# Patient Record
Sex: FEMALE | Race: WHITE | NOT HISPANIC OR LATINO | Employment: OTHER | URBAN - METROPOLITAN AREA
[De-identification: names, ages, dates, MRNs, and addresses within clinical notes are randomized per-mention and may not be internally consistent; named-entity substitution may affect disease eponyms.]

---

## 2018-04-24 ENCOUNTER — HOSPITAL ENCOUNTER (EMERGENCY)
Facility: CLINIC | Age: 43
Discharge: HOME | End: 2018-04-25
Attending: EMERGENCY MEDICINE | Admitting: EMERGENCY MEDICINE

## 2018-04-24 DIAGNOSIS — R45.1 AGITATION: ICD-10-CM

## 2018-04-24 DIAGNOSIS — F15.10 METHAMPHETAMINE ABUSE (H): ICD-10-CM

## 2018-04-24 PROCEDURE — 99283 EMERGENCY DEPT VISIT LOW MDM: CPT

## 2018-04-24 PROCEDURE — 25000132 ZZH RX MED GY IP 250 OP 250 PS 637: Performed by: EMERGENCY MEDICINE

## 2018-04-24 RX ORDER — DIAZEPAM 5 MG
5 TABLET ORAL ONCE
Status: COMPLETED | OUTPATIENT
Start: 2018-04-24 | End: 2018-04-24

## 2018-04-24 RX ORDER — LIDOCAINE 40 MG/G
CREAM TOPICAL
Status: DISCONTINUED | OUTPATIENT
Start: 2018-04-24 | End: 2018-04-25

## 2018-04-24 RX ORDER — SODIUM CHLORIDE 9 MG/ML
1000 INJECTION, SOLUTION INTRAVENOUS CONTINUOUS
Status: DISCONTINUED | OUTPATIENT
Start: 2018-04-24 | End: 2018-04-25

## 2018-04-24 RX ADMIN — DIAZEPAM 5 MG: 5 TABLET ORAL at 23:59

## 2018-04-24 ASSESSMENT — ENCOUNTER SYMPTOMS: ABDOMINAL PAIN: 0

## 2018-04-24 NOTE — ED AVS SNAPSHOT
[facility] Emergency Department    [address]    [facility] [address]    Phone:  [phone]    Fax:  [phone]                                       Maricarmen Tovar   MRN: [id number]    Department:  [facility] Emergency Department   Date of Visit:  4/24/2018           After Visit Summary Signature Page     I have received my discharge instructions, and my questions have been answered. I have discussed any challenges I see with this plan with the nurse or doctor.    ..........................................................................................................................................  Patient/Patient Representative Signature      ..........................................................................................................................................  Patient Representative Print Name and Relationship to Patient    ..................................................               ................................................  Date                                            Time    ..........................................................................................................................................  Reviewed by Signature/Title    ...................................................              ..............................................  Date                                                            Time

## 2018-04-24 NOTE — ED AVS SNAPSHOT
[facility] Emergency Department    [address]    [facility] [address]    Phone:  [phone]    Fax:  [phone]                                       Maricarmen Tovar   MRN: [id number]    Department:  [facility] Emergency Department   Date of Visit:  4/24/2018           Patient Information     Date Of Birth          1975        Your diagnoses for this visit were:     Methamphetamine abuse     Agitation        You were seen by Mara Lopez MD and Edwin Lancaster MD.      Follow-up Information     Follow up with Primary clinic, chemical dependency clinic.    Why:  when you return home        Follow up with [facility] Emergency Department.    Specialty:  EMERGENCY MEDICINE    Why:  As needed, If symptoms worsen    Contact information:    [address]  [facility] [address] [phone]      Discharge References/Attachments     METHAMPHETAMINE ABUSE AND ADDICTION, UNDERSTANDING (ENGLISH)      24 Hour Appointment Hotline       To make an appointment at any [facility], call [phone] ([phone]). If you don't have a family doctor or clinic, we will help you find one. [facility] clinics are conveniently located to serve the needs of you and your family.             Review of your medicines      Notice     You have not been prescribed any medications.            Procedures and tests performed during your visit     Social Work IP Consult      Orders Needing Specimen Collection     None      Pending Results     No orders found for last 3 day(s).            Pending Culture Results     No orders found for last 3 day(s).            Pending Results Instructions     If you had any lab results that were not finalized at the time of your Discharge, you can call the ED Lab Result RN at [phone]. You will be contacted by this team for any positive Lab results or changes in treatment. The nurses are available 7 days a  week from 10A to 6:30P.  You can leave a message 24 hours per day and they will return your call.        Test Results From Your Hospital Stay               Clinical Quality Measure: Blood Pressure Screening     Your blood pressure was checked while you were in the emergency department today. The last reading we obtained was  BP: (!) 155/100 . Please read the guidelines below about what these numbers mean and what you should do about them.  If your systolic blood pressure (the top number) is less than 120 and your diastolic blood pressure (the bottom number) is less than 80, then your blood pressure is normal. There is nothing more that you need to do about it.  If your systolic blood pressure (the top number) is 120-139 or your diastolic blood pressure (the bottom number) is 80-89, your blood pressure may be higher than it should be. You should have your blood pressure rechecked within a year by a primary care provider.  If your systolic blood pressure (the top number) is 140 or greater or your diastolic blood pressure (the bottom number) is 90 or greater, you may have high blood pressure. High blood pressure is treatable, but if left untreated over time it can put you at risk for heart attack, stroke, or kidney failure. You should have your blood pressure rechecked by a primary care provider within the next 4 weeks.  If your provider in the emergency department today gave you specific instructions to follow-up with your doctor or provider even sooner than that, you should follow that instruction and not wait for up to 4 weeks for your follow-up visit.        Thank you for choosing [facility]       Thank you for choosing [facility] for your care. Our goal is always to provide you with excellent care. Hearing back from our patients is one way we can continue to improve our services. Please take a few minutes to complete the written survey that you may receive in the mail after you visit with us. Thank you!        Karla  "Information     [employer] lets you send messages to your doctor, view your test results, renew your prescriptions, schedule appointments and more. To sign up, go to [email or web address] . Click on \"Log in\" on the left side of the screen, which will take you to the Welcome page. Then click on \"Sign up Now\" on the right side of the page.     You will be asked to enter the access code listed below, as well as some personal information. Please follow the directions to create your username and password.     Your access code is: [email or web address]  Expires: 2018 11:56 AM     Your access code will  in 90 days. If you need help or a new code, please call your [facility] clinic or [phone].        Care EveryWhere ID     This is your Care EveryWhere ID. This could be used by other organizations to access your [facility] medical records  [id number]        Equal Access to Services     ASMITA DERAS AH: Hadii carmen joneso Sonarinder, waaxda lugiorgio, qaybta kaalmada aderuslan, phyllis cummings . So [facility] [phone].    ATENCIÓN: Si habla español, tiene a kelly disposición servicios gratuitos de asistencia lingüística. Llame al [phone].    We comply with applicable federal civil rights laws and [place] laws. We do not discriminate on the basis of race, color, national origin, age, disability, sex, sexual orientation, or gender identity.            After Visit Summary       This is your record. Keep this with you and show to your community pharmacist(s) and doctor(s) at your next visit.                  "

## 2018-04-25 VITALS
OXYGEN SATURATION: 98 % | TEMPERATURE: 98.8 F | RESPIRATION RATE: 18 BRPM | SYSTOLIC BLOOD PRESSURE: 144 MMHG | DIASTOLIC BLOOD PRESSURE: 90 MMHG | WEIGHT: 110 LBS | HEART RATE: 86 BPM

## 2018-04-25 ASSESSMENT — ENCOUNTER SYMPTOMS: AGITATION: 1

## 2018-04-25 NOTE — ED NOTES
"Pt tolerating regular meal tray. Up at bedside Gait steady. Pt calling mother for possible ride to her home. Has declined homeless shelter. \" I am going to hitch a ride while its nice outside.   "
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
## 2018-04-25 NOTE — ED NOTES
Patient refusing IV and EKG currently, also refusing blood draw. Patient requesting for food. Tatamy and water was provided to patient. Will let patient eat and recheck later for blood draw. Still appearing manic and very restless. Denies any chest pain, sob, and physical complaints. MD made aware. Will continue to monitor.

## 2018-04-25 NOTE — ED PROVIDER NOTES
[facility] ED Behavioral Health Handoff Note:       Brief HPI:  This is a 43 year old female signed out to me by Dr. Lopez .  See initial ED Provider note for details of the presentation.     The patient is on a hold.  The type of hold is ALETA.        The patient has required medication for agitation. Patient got one dose of PO valium with good effect.    Exam:   Temp:  [98.8  F (37.1  C)] 98.8  F (37.1  C)  Pulse:  [100] 100  Heart Rate:  [100-106] 106  Resp:  [18-22] 18  BP: (138-149)/(112-127) 138/112  SpO2:  [98 %-100 %] 98 %  Patient calm, arranging her belongings on the ED stretcher and floor in preparation for discharge. She is awake alert and appropriate. Calm and cooperative. She has normal speech and no evidence of psychomotor agitation or slowing. Exhibits goal directed thinking. Gait normal and steady.     ED Course:    There were no significant events while under my care.    Social work spoke with patient to offer resources in the area. The patient declined local resources. Bus tokens provided to help her with transportation to get home. Patient was sober and stable for discharge. She denied any acute medical concerns prior to discharge.         Impression:    ICD-10-CM    1. Methamphetamine abuse F15.10    2. Agitation R45.1        Disposition:  Discharged in improved condition        Edwin Booth MD  04/25/18 2054
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
## 2018-04-25 NOTE — ED NOTES
Patient is resting in bed and is again refusing EKG, IV fluids, and blood draw. Wants to be left alone, otherwise has no other complaints.

## 2018-04-25 NOTE — ED NOTES
"Pt up to the BR. Gait steady. Pt states \" I feel better just a little tired.\" Tolerating oral fluids without nausea.   "

## 2018-08-03 ENCOUNTER — HOSPITAL ENCOUNTER (EMERGENCY)
Facility: CLINIC | Age: 43
Discharge: HOME | End: 2018-08-03
Attending: EMERGENCY MEDICINE | Admitting: EMERGENCY MEDICINE

## 2018-08-03 ENCOUNTER — APPOINTMENT (OUTPATIENT)
Dept: CT IMAGING | Facility: CLINIC | Age: 43
End: 2018-08-03
Attending: EMERGENCY MEDICINE

## 2018-08-03 VITALS
HEART RATE: 87 BPM | DIASTOLIC BLOOD PRESSURE: 98 MMHG | OXYGEN SATURATION: 99 % | SYSTOLIC BLOOD PRESSURE: 151 MMHG | RESPIRATION RATE: 16 BRPM | TEMPERATURE: 98.6 F

## 2018-08-03 DIAGNOSIS — L03.213 PERIORBITAL CELLULITIS OF LEFT EYE: ICD-10-CM

## 2018-08-03 LAB
ANION GAP SERPL CALCULATED.3IONS-SCNC: 6 MMOL/L (ref 3–14)
BASOPHILS # BLD AUTO: 0.1 10E9/L (ref 0–0.2)
BASOPHILS NFR BLD AUTO: 1 %
BUN SERPL-MCNC: 14 MG/DL (ref 7–30)
CALCIUM SERPL-MCNC: 8.4 MG/DL (ref 8.5–10.1)
CHLORIDE SERPL-SCNC: 106 MMOL/L (ref 94–109)
CO2 SERPL-SCNC: 28 MMOL/L (ref 20–32)
CREAT SERPL-MCNC: 0.87 MG/DL (ref 0.52–1.04)
DIFFERENTIAL METHOD BLD: NORMAL
EOSINOPHIL # BLD AUTO: 0.2 10E9/L (ref 0–0.7)
EOSINOPHIL NFR BLD AUTO: 2.5 %
ERYTHROCYTE [DISTWIDTH] IN BLOOD BY AUTOMATED COUNT: 12.5 % (ref 10–15)
GFR SERPL CREATININE-BSD FRML MDRD: 71 ML/MIN/1.7M2
GLUCOSE SERPL-MCNC: 94 MG/DL (ref 70–99)
HCT VFR BLD AUTO: 44.7 % (ref 35–47)
HGB BLD-MCNC: 14.6 G/DL (ref 11.7–15.7)
IMM GRANULOCYTES # BLD: 0 10E9/L (ref 0–0.4)
IMM GRANULOCYTES NFR BLD: 0.2 %
LYMPHOCYTES # BLD AUTO: 2.4 10E9/L (ref 0.8–5.3)
LYMPHOCYTES NFR BLD AUTO: 30 %
MCH RBC QN AUTO: 30.2 PG (ref 26.5–33)
MCHC RBC AUTO-ENTMCNC: 32.7 G/DL (ref 31.5–36.5)
MCV RBC AUTO: 93 FL (ref 78–100)
MONOCYTES # BLD AUTO: 0.5 10E9/L (ref 0–1.3)
MONOCYTES NFR BLD AUTO: 6 %
NEUTROPHILS # BLD AUTO: 4.8 10E9/L (ref 1.6–8.3)
NEUTROPHILS NFR BLD AUTO: 60.3 %
NRBC # BLD AUTO: 0 10*3/UL
NRBC BLD AUTO-RTO: 0 /100
PLATELET # BLD AUTO: 280 10E9/L (ref 150–450)
POTASSIUM SERPL-SCNC: 3.7 MMOL/L (ref 3.4–5.3)
RBC # BLD AUTO: 4.83 10E12/L (ref 3.8–5.2)
SODIUM SERPL-SCNC: 140 MMOL/L (ref 133–144)
WBC # BLD AUTO: 8 10E9/L (ref 4–11)

## 2018-08-03 PROCEDURE — 25000128 H RX IP 250 OP 636: Performed by: EMERGENCY MEDICINE

## 2018-08-03 PROCEDURE — 85025 COMPLETE CBC W/AUTO DIFF WBC: CPT | Performed by: EMERGENCY MEDICINE

## 2018-08-03 PROCEDURE — 99285 EMERGENCY DEPT VISIT HI MDM: CPT | Mod: 25

## 2018-08-03 PROCEDURE — 80048 BASIC METABOLIC PNL TOTAL CA: CPT | Performed by: EMERGENCY MEDICINE

## 2018-08-03 PROCEDURE — 70487 CT MAXILLOFACIAL W/DYE: CPT

## 2018-08-03 PROCEDURE — 25000132 ZZH RX MED GY IP 250 OP 250 PS 637: Performed by: EMERGENCY MEDICINE

## 2018-08-03 RX ORDER — CLINDAMYCIN HCL 150 MG
450 CAPSULE ORAL ONCE
Status: COMPLETED | OUTPATIENT
Start: 2018-08-03 | End: 2018-08-03

## 2018-08-03 RX ORDER — IOPAMIDOL 755 MG/ML
500 INJECTION, SOLUTION INTRAVASCULAR ONCE
Status: COMPLETED | OUTPATIENT
Start: 2018-08-03 | End: 2018-08-03

## 2018-08-03 RX ORDER — CLINDAMYCIN HCL 150 MG
450 CAPSULE ORAL 4 TIMES DAILY
Qty: 84 CAPSULE | Refills: 0 | Status: SHIPPED | OUTPATIENT
Start: 2018-08-03 | End: 2018-08-10

## 2018-08-03 RX ADMIN — SODIUM CHLORIDE 65 ML: 900 INJECTION, SOLUTION INTRAVENOUS at 08:45

## 2018-08-03 RX ADMIN — CLINDAMYCIN HYDROCHLORIDE 450 MG: 150 CAPSULE ORAL at 08:20

## 2018-08-03 RX ADMIN — IOPAMIDOL 80 ML: 755 INJECTION, SOLUTION INTRAVENOUS at 08:44

## 2018-08-03 ASSESSMENT — ENCOUNTER SYMPTOMS
WOUND: 1
EYE PAIN: 1
FACIAL SWELLING: 1
FEVER: 1
SHORTNESS OF BREATH: 0

## 2018-08-03 NOTE — ED AVS SNAPSHOT
[facility] Emergency Department    [address]    [facility] [address]    Phone:  [phone]    Fax:  [phone]                                       Maricarmen Tovar   MRN: [id number]    Department:  [facility] Emergency Department   Date of Visit:  8/3/2018           Patient Information     Date Of Birth          1975        Your diagnoses for this visit were:     Periorbital cellulitis of left eye        You were seen by Pal Desai DO.      Follow-up Information     Follow up with [facility]. Call in 3 days.    Specialty:  Family Medicine    Why:  To establish care with a primary care clinic    Contact information:    [address] [facility] [address] [phone]        Follow up with [facility] Emergency Department.    Specialty:  EMERGENCY MEDICINE    Why:  If symptoms worsen or for wound recheck in 48 hours if not improving    Contact information:    [address]  [facility] [address] [phone]        Discharge Instructions         Periorbital Cellulitis  Periorbital cellulitis is an infection of the tissues around the eye. It is most often caused by an infected scratch or insect bite. Sometimes a sinus infection can cause this problem.  Home care  The following are general care guidelines:  1. Take your antibiotic medicine exactly as directed, until it is finished.  2. You may use over-the-counter medicine as directed based on age and weight to help with pain and fever, unless another pain medicine was given. If you have liver disease or ever had a stomach ulcer, talk with your healthcare provider before using these medicines. Do not use ibuprofen in children under 6 months of age. Aspirin should never be used in anyone under 18 years of age who is ill with a fever. It may cause severe illness or death.  Follow-up care  Follow up with your healthcare provider, or  as advised.  When to seek medical advice  Call your healthcare provider right away if any of these occur:    Increasing swelling or pain around the eye    Increasing redness    Changes in vision    Fever of 100.4 (38  C) oral or 101.5 (38.6  C) rectal for more than 2 days on antibiotics  Date Last Reviewed: 6/1/2016 2000-2017 [employer]. [address] [place] [address]. All rights reserved. This information is not intended as a substitute for professional medical care. Always follow your healthcare professional's instructions.          24 Hour Appointment Hotline       To make an appointment at any [facility], call [phone] ([phone]). If you don't have a family doctor or clinic, we will help you find one. [facility] are conveniently located to serve the needs of you and your family.             Review of your medicines      START taking        Dose / Directions Last dose taken    clindamycin 150 MG capsule   Commonly known as:  CLEOCIN   Dose:  450 mg   Quantity:  84 capsule        Take 3 capsules (450 mg) by mouth 4 times daily for 7 days   Refills:  0                Prescriptions were sent or printed at these locations (1 Prescription)                   Other Prescriptions                Printed at Department/Unit printer (1 of 1)         clindamycin (CLEOCIN) 150 MG capsule                Procedures and tests performed during your visit     Basic metabolic panel    CBC with platelets differential    CT Facial Bones with Contrast    Peripheral IV catheter      Orders Needing Specimen Collection     None      Pending Results     Date and Time Order Name Status Description    8/3/2018 0745 CT Facial Bones with Contrast Preliminary             Pending Culture Results     No orders found from 8/1/2018 to 8/4/2018.            Pending Results Instructions     If you had any lab results that were not finalized at the time of your Discharge, you can call the ED Lab  Result RN at [phone]. You will be contacted by this team for any positive Lab results or changes in treatment. The nurses are available 7 days a week from 10A to 6:30P.  You can leave a message 24 hours per day and they will return your call.        Test Results From Your Hospital Stay        8/3/2018  8:49 AM      Component Results     Component Value Ref Range & Units Status    WBC 8.0 4.0 - 11.0 10e9/L Final    RBC Count 4.83 3.8 - 5.2 10e12/L Final    Hemoglobin 14.6 11.7 - 15.7 g/dL Final    Hematocrit 44.7 35.0 - 47.0 % Final    MCV 93 78 - 100 fl Final    MCH 30.2 26.5 - 33.0 pg Final    MCHC 32.7 31.5 - 36.5 g/dL Final    RDW 12.5 10.0 - 15.0 % Final    Platelet Count 280 150 - 450 10e9/L Final    Diff Method Automated Method  Final    % Neutrophils 60.3 % Final    % Lymphocytes 30.0 % Final    % Monocytes 6.0 % Final    % Eosinophils 2.5 % Final    % Basophils 1.0 % Final    % Immature Granulocytes 0.2 % Final    Nucleated RBCs 0 0 /100 Final    Absolute Neutrophil 4.8 1.6 - 8.3 10e9/L Final    Absolute Lymphocytes 2.4 0.8 - 5.3 10e9/L Final    Absolute Monocytes 0.5 0.0 - 1.3 10e9/L Final    Absolute Eosinophils 0.2 0.0 - 0.7 10e9/L Final    Absolute Basophils 0.1 0.0 - 0.2 10e9/L Final    Abs Immature Granulocytes 0.0 0 - 0.4 10e9/L Final    Absolute Nucleated RBC 0.0  Final         8/3/2018  9:06 AM      Component Results     Component Value Ref Range & Units Status    Sodium 140 133 - 144 mmol/L Final    Potassium 3.7 3.4 - 5.3 mmol/L Final    Chloride 106 94 - 109 mmol/L Final    Carbon Dioxide 28 20 - 32 mmol/L Final    Anion Gap 6 3 - 14 mmol/L Final    Glucose 94 70 - 99 mg/dL Final    Urea Nitrogen 14 7 - 30 mg/dL Final    Creatinine 0.87 0.52 - 1.04 mg/dL Final    GFR Estimate 71 >60 mL/min/1.7m2 Final    Non  GFR Calc    GFR Estimate If Black 87 >60 mL/min/1.7m2 Final    African American GFR Calc    Calcium 8.4 (L) 8.5 - 10.1 mg/dL Final         8/3/2018  8:54 AM       Narrative     CT SCAN OF THE FACE WITH CONTRAST 8/3/2018 8:49 AM     HISTORY: left periorbital cellulitis;     TECHNIQUE:  Axial scans with sagittal and coronal reformations.  Radiation dose for this scan was reduced using automated exposure  control, adjustment of the mA and/or kV according to patient size, or  iterative reconstruction technique. 80mL Isovue-370    COMPARISON: None.    FINDINGS: There is preseptal soft tissue swelling over the left orbit.  No discrete fluid collections are identified. Post septal soft tissues  in the orbits are negative. No orbital abscess is identified. The  paranasal sinuses are clear. Cavernous sinuses appear normal. The  superior ophthalmic veins appear patent. Visualized portions of the  brain parenchyma appear normal.        Impression     IMPRESSION: Preseptal soft tissue swelling over the left orbit. This  is consistent with cellulitis. No orbital abscess is seen.                Clinical Quality Measure: Blood Pressure Screening     Your blood pressure was checked while you were in the emergency department today. The last reading we obtained was  BP: (!) 143/95 . Please read the guidelines below about what these numbers mean and what you should do about them.  If your systolic blood pressure (the top number) is less than 120 and your diastolic blood pressure (the bottom number) is less than 80, then your blood pressure is normal. There is nothing more that you need to do about it.  If your systolic blood pressure (the top number) is 120-139 or your diastolic blood pressure (the bottom number) is 80-89, your blood pressure may be higher than it should be. You should have your blood pressure rechecked within a year by a primary care provider.  If your systolic blood pressure (the top number) is 140 or greater or your diastolic blood pressure (the bottom number) is 90 or greater, you may have high blood pressure. High blood pressure is treatable, but if left untreated over  "time it can put you at risk for heart attack, stroke, or kidney failure. You should have your blood pressure rechecked by a primary care provider within the next 4 weeks.  If your provider in the emergency department today gave you specific instructions to follow-up with your doctor or provider even sooner than that, you should follow that instruction and not wait for up to 4 weeks for your follow-up visit.        Thank you for choosing [facility]       Thank you for choosing [facility] for your care. Our goal is always to provide you with excellent care. Hearing back from our patients is one way we can continue to improve our services. Please take a few minutes to complete the written survey that you may receive in the mail after you visit with us. Thank you!        [employer] Information     [employer] lets you send messages to your doctor, view your test results, renew your prescriptions, schedule appointments and more. To sign up, go to [email or web address] . Click on \"Log in\" on the left side of the screen, which will take you to the Welcome page. Then click on \"Sign up Now\" on the right side of the page.     You will be asked to enter the access code listed below, as well as some personal information. Please follow the directions to create your username and password.     Your access code is: [email or web address]  Expires: 2018  7:59 AM     Your access code will  in 90 days. If you need help or a new code, please call your [facility] clinic or [phone].        Care EveryWhere ID     This is your Care EveryWhere ID. This could be used by other organizations to access your [facility] medical records  [id number]        Equal Access to Services     [facility] : Hadii carmen Swenson, waaxda lugiorgio, qaybta phyllis roque. So [facility] [phone].    ATENCIÓN: Si habla español, tiene a kelly disposición servicios gratuitos de asistencia lingüística. Llame al " [phone].    We comply with applicable federal civil rights laws and [place] laws. We do not discriminate on the basis of race, color, national origin, age, disability, sex, sexual orientation, or gender identity.            After Visit Summary       This is your record. Keep this with you and show to your community pharmacist(s) and doctor(s) at your next visit.

## 2018-08-03 NOTE — LETTER
August 3, 2018      To Whom It May Concern:      Maricarmen Tovar was seen in our Emergency Department today, 08/03/18.  I expect her condition to improve over the next 1-2 days.  She may return to work/school when improved.    Sincerely,        Lisha Leija RN

## 2018-08-03 NOTE — DISCHARGE INSTRUCTIONS
Periorbital Cellulitis  Periorbital cellulitis is an infection of the tissues around the eye. It is most often caused by an infected scratch or insect bite. Sometimes a sinus infection can cause this problem.  Home care  The following are general care guidelines:  1. Take your antibiotic medicine exactly as directed, until it is finished.  2. You may use over-the-counter medicine as directed based on age and weight to help with pain and fever, unless another pain medicine was given. If you have liver disease or ever had a stomach ulcer, talk with your healthcare provider before using these medicines. Do not use ibuprofen in children under 6 months of age. Aspirin should never be used in anyone under 18 years of age who is ill with a fever. It may cause severe illness or death.  Follow-up care  Follow up with your healthcare provider, or as advised.  When to seek medical advice  Call your healthcare provider right away if any of these occur:    Increasing swelling or pain around the eye    Increasing redness    Changes in vision    Fever of 100.4 (38  C) oral or 101.5 (38.6  C) rectal for more than 2 days on antibiotics  Date Last Reviewed: 6/1/2016 2000-2017 [employer]. [address] [place] [address]. All rights reserved. This information is not intended as a substitute for professional medical care. Always follow your healthcare professional's instructions.

## 2018-08-03 NOTE — ED AVS SNAPSHOT
[facility] Emergency Department    [address]    [facility] [address]    Phone:  [phone]    Fax:  [phone]                                       Maricarmen Tovar   MRN: [id number]    Department:  [facility] Emergency Department   Date of Visit:  8/3/2018           After Visit Summary Signature Page     I have received my discharge instructions, and my questions have been answered. I have discussed any challenges I see with this plan with the nurse or doctor.    ..........................................................................................................................................  Patient/Patient Representative Signature      ..........................................................................................................................................  Patient Representative Print Name and Relationship to Patient    ..................................................               ................................................  Date                                            Time    ..........................................................................................................................................  Reviewed by Signature/Title    ...................................................              ..............................................  Date                                                            Time

## 2018-08-03 NOTE — ED PROVIDER NOTES
History     Chief Complaint:  Swollen eye pain    The history is provided by the patient.     Maricarmen Tovar is a 43 year old female with a history of MRSA infection who presents with a swollen left eye. The patient reports that she woke up yesterday morning and felt like she had a boil above her left eye that was painful and swelling around the eyebrow. She does admit that she has been picking the boil. She presents to the ED this morning with worsening painful swelling in the left orbit. She also endorses fever with cold sweats, but otherwise denies any shortness of breath. The patient does have a history of methamphetamine use and reports her last usage about 5 days ago.     Allergies:  Bactrim     Medications:    The patient is not currently taking any prescribed medications.    Past Medical History:    Hepatitis C    Past Surgical History:    History reviewed. No pertinent surgical history.    Family History:    History reviewed. No pertinent family history.     Social History:  Smoking Status: Yes Smoker  Alcohol Use: No  Patient presents by herself.  Marital Status:  Single      Review of Systems   Constitutional: Positive for fever (subjective).   HENT: Positive for facial swelling.    Eyes: Positive for pain (eyebrow area pain).   Respiratory: Negative for shortness of breath.    Skin: Positive for wound (scab on lateral left eyebrow).   All other systems reviewed and are negative.    Physical Exam     Patient Vitals for the past 24 hrs:   BP Temp Temp src Pulse Resp SpO2   08/03/18 0740 (!) 147/118 98.6  F (37  C) Oral 87 16 100 %     Physical Exam  Constitutional: Patient appears well-developed and well-nourished. There is minimal distress.   HEENT:    Head: No external signs of trauma. No lesions noted.   Eyes: PEERL, EOMI B/L, no pain or limitation of superior gaze. There is swelling of the upper eyelid. No proptosis.   Ears: Normal B/L TM and external canals   Nose: Noncongested, no exudates.  No rhinorrhea. No FB noted   Mouth/Throat: Mucous membranes are moist and normal.   Neck: FROM. Neck is supple  Cardiovascular: Normal rate, regular rhythm and normal heart sounds.  Exam reveals no gallop and no friction rub.  No murmur heard. Equal B/L peripheral pulses.  Pulmonary/Chest: Effort normal and breath sounds normal. No respiratory distress. Patient has no wheezes. Patient has no rales.   Abdominal: Soft. There is no tenderness.   Extremities: No edema noted  Neurological: Patient is alert and oriented to person, place, and time.   Skin: Skin is warm and dry. There is no diaphoresis noted. There is swelling without erythema deep to the left eyebrow and extending inferiorly into the left upper eyelid. There is a scab noted on the lateral left eyebrow. No purulent drainage noted.    Emergency Department Course     Imaging:  Radiographic findings were communicated with the patient who voiced understanding of the findings.    CT Facial Bones with Contrast:  Preseptal soft tissue swelling over the left orbit. This  is consistent with cellulitis. No orbital abscess is seen.  As read by Radiology.    Laboratory:  CBC: WNL (WBC 8.0, HGB 14.6, )  BMP: Calcium 8.4 (L), o/w WNL (Creatinine 0.87)    Interventions:  0820 - Clindamycin 450 mg PO    Emergency Department Course:  Past medical records, nursing notes, and vitals reviewed.  0735: I performed an exam of the patient and obtained history, as documented above.    IV inserted and blood drawn.  The patient was sent for a CT while in the emergency department, findings above.    0905: I rechecked the patient. Findings and plan explained to the Patient. Patient discharged home with instructions regarding supportive care, medications, and reasons to return. The importance of close follow-up and completion of antibiotic course was reviewed.     Impression & Plan      Medical Decision Making:  This 43-year-old female patient presents to the ED due to concerns  for eyelid swelling.  Please see the HPI and exam for specifics.  The patient was found to have left periorbital cellulitis.  She states that she did take an extra antibiotic tablet that she had leftover before coming in today.  I told her that she needs to finish all the antibiotics and not have any leftover.  This will reduce the risk of infections happening again.  Anticipatory guidance given prior to discharge.    Diagnosis:    ICD-10-CM    1. Periorbital cellulitis of left eye L03.213 Basic metabolic panel     Disposition:  Discharged to home.  Discharge Medications:  New Prescriptions    CLINDAMYCIN (CLEOCIN) 150 MG CAPSULE    Take 3 capsules (450 mg) by mouth 4 times daily for 7 days     Julius Irvin  8/3/2018   [facility] EMERGENCY DEPARTMENT  IJulius Chi, am serving as a scribe at 7:35 AM on 8/3/2018 to document services personally performed by Pal Desai DO based on my observations and the provider's statements to me.    '     Pal Desai DO  08/03/18 0912       Pal Desai DO  08/03/18 0913

## 2023-05-12 ENCOUNTER — APPOINTMENT (OUTPATIENT)
Dept: GENERAL RADIOLOGY | Facility: CLINIC | Age: 48
End: 2023-05-12
Attending: FAMILY MEDICINE
Payer: COMMERCIAL

## 2023-05-12 ENCOUNTER — HOSPITAL ENCOUNTER (EMERGENCY)
Facility: CLINIC | Age: 48
Discharge: HOME | End: 2023-05-12
Attending: FAMILY MEDICINE | Admitting: FAMILY MEDICINE
Payer: COMMERCIAL

## 2023-05-12 VITALS
HEIGHT: 62 IN | OXYGEN SATURATION: 100 % | TEMPERATURE: 97.7 F | WEIGHT: 150 LBS | SYSTOLIC BLOOD PRESSURE: 130 MMHG | HEART RATE: 59 BPM | BODY MASS INDEX: 27.6 KG/M2 | DIASTOLIC BLOOD PRESSURE: 93 MMHG | RESPIRATION RATE: 18 BRPM

## 2023-05-12 DIAGNOSIS — M79.671 RIGHT FOOT PAIN: ICD-10-CM

## 2023-05-12 PROCEDURE — 73610 X-RAY EXAM OF ANKLE: CPT | Mod: RT

## 2023-05-12 PROCEDURE — 99284 EMERGENCY DEPT VISIT MOD MDM: CPT | Performed by: FAMILY MEDICINE

## 2023-05-12 PROCEDURE — 73630 X-RAY EXAM OF FOOT: CPT | Mod: RT

## 2023-05-12 RX ORDER — ACETAMINOPHEN 160 MG
1 TABLET,DISINTEGRATING ORAL DAILY
COMMUNITY
Start: 2023-04-22

## 2023-05-12 RX ORDER — ACETAMINOPHEN 500 MG
1000 TABLET ORAL ONCE
Status: COMPLETED | OUTPATIENT
Start: 2023-05-12 | End: 2023-05-12

## 2023-05-12 RX ORDER — ACETAMINOPHEN 500 MG
500-1000 TABLET ORAL EVERY 6 HOURS PRN
Qty: 30 TABLET | Refills: 0 | Status: SHIPPED | OUTPATIENT
Start: 2023-05-12 | End: 2023-05-19

## 2023-05-12 RX ORDER — HYDROXYZINE PAMOATE 50 MG/1
CAPSULE ORAL
COMMUNITY
Start: 2023-04-22

## 2023-05-12 RX ORDER — IBUPROFEN 200 MG
600 TABLET ORAL EVERY 6 HOURS PRN
Qty: 30 TABLET | Refills: 0 | Status: SHIPPED | OUTPATIENT
Start: 2023-05-12 | End: 2023-05-12

## 2023-05-12 RX ORDER — GABAPENTIN 100 MG/1
CAPSULE ORAL
COMMUNITY
Start: 2023-04-17

## 2023-05-12 RX ORDER — LISINOPRIL 10 MG/1
1 TABLET ORAL DAILY
COMMUNITY
Start: 2021-07-20

## 2023-05-12 ASSESSMENT — ACTIVITIES OF DAILY LIVING (ADL): ADLS_ACUITY_SCORE: 35

## 2023-05-12 NOTE — ED PROVIDER NOTES
"    [facility] [place] EMERGENCY DEPARTMENT ([facility])     May 12, 2023     History     Chief Complaint   Patient presents with     Ankle Pain     R ankle pain x 1 day. Pt reports having a stroke last year and has absence of sensation on R side. Pt unsure if she injured ankle or if \"it's nerves waking up\".      HPI  Maricarmen Tovar is a 48 year old female with a past medical history which includes cellulitis, chronic Hep C infection, sepsis, HTN, methamphetamine abuse, and left intracranial hemorrhage that occurred in May 2022 who presents to the Emergency Department for evaluation of right ankle pain.  She reports that the pain began yesterday.  States that she was \"putting her right foot on the ground to feel earth\" and then the pain began acutely.  She describes the pain as being a burning sensation that begins in her big toe of the right foot and radiates up her right leg and possibly to her back, although she does state that she is unsure about her symptoms since she has decreased sensation on the right side of her body.  States that she has not taken any medications since the incident.  Denies any urinary incontinence.     Past Medical History  Past Medical History:   Diagnosis Date     Hepatitis C      History reviewed. No pertinent surgical history.  acetaminophen (TYLENOL) 500 MG tablet  Cholecalciferol (VITAMIN D3) 50 MCG (2000 UT) CAPS  diclofenac (VOLTAREN) 1 % topical gel  lisinopril (ZESTRIL) 10 MG tablet  gabapentin (NEURONTIN) 100 MG capsule  hydrOXYzine (VISTARIL) 50 MG capsule      Allergies   Allergen Reactions     Sulfa Antibiotics Hives     Bactrim [Sulfamethoxazole-Trimethoprim]      Kiwi Swelling     Family History  History reviewed. No pertinent family history.  Social History   Social History     Tobacco Use     Smoking status: Every Day     Types: Cigarettes     Smokeless tobacco: Never   Substance Use Topics     Alcohol use: No     Drug use: Not Currently     Types: Methamphetamines, " "Marijuana      Past medical history, past surgical history, medications, allergies, family history, and social history were reviewed with the patient. No additional pertinent items.      A medically appropriate review of systems was performed with pertinent positives and negatives noted in the HPI, and all other systems negative.    Physical Exam   BP: (!) 132/92  Pulse: 73  Temp: 97.7  F (36.5  C)  Resp: 18  Height: 157.5 cm (5' 2\")  Weight: 68 kg (150 lb)  SpO2: 99 %  Physical Exam  Cardiovascular:      Rate and Rhythm: Normal rate and regular rhythm.   Pulmonary:      Effort: Pulmonary effort is normal.   Musculoskeletal:         General: Swelling and tenderness present.      Right lower leg: No swelling. No edema.      Left lower leg: Normal. No swelling. No edema.      Right foot: Normal range of motion. Tenderness present. Normal pulse.      Left foot: No tenderness. Normal pulse.   Neurological:      General: No focal deficit present.      Mental Status: She is alert and oriented to person, place, and time.      Sensory: Sensory deficit present.      Comments: Sensory deficit on R arm and R leg    Psychiatric:         Mood and Affect: Mood normal.         ED Course, Procedures, & Data      Procedures                     Results for orders placed or performed during the hospital encounter of 05/12/23   Foot  XR, G/E 3 views, right     Status: None    Narrative    ANKLE RIGHT THREE OR MORE VIEWS, FOOT RIGHT THREE OR MORE VIEWS  DATE/TIME: 5/12/2023 1:34 PM     INDICATION: Right foot and ankle pain after trauma.   COMPARISON: None.      Impression    IMPRESSION:  1.  Normal joint spacing and alignment.  2.  No fracture.    SHARON BASSETT MD         SYSTEM ID:  [id number]   Ankle XR, G/E 3 views, right     Status: None    Narrative    ANKLE RIGHT THREE OR MORE VIEWS, FOOT RIGHT THREE OR MORE VIEWS  DATE/TIME: 5/12/2023 1:34 PM     INDICATION: Right foot and ankle pain after trauma.   COMPARISON: None.      " Impression    IMPRESSION:  1.  Normal joint spacing and alignment.  2.  No fracture.    SHARON BASSETT MD         SYSTEM ID:  [id number]     Medications   acetaminophen (TYLENOL) tablet 1,000 mg (1,000 mg Oral Not Given 5/12/23 1310)     Labs Ordered and Resulted from Time of ED Arrival to Time of ED Departure - No data to display  Ankle XR, G/E 3 views, right   Final Result   IMPRESSION:   1.  Normal joint spacing and alignment.   2.  No fracture.      SHARON BASSETT MD            SYSTEM ID:  [id number]      Foot  XR, G/E 3 views, right   Final Result   IMPRESSION:   1.  Normal joint spacing and alignment.   2.  No fracture.      SHARON BASSETT MD            SYSTEM ID:  [id number]                 Assessment & Plan    Maricarmen Tovar is a 48 year old female with a past medical history which includes cellulitis, chronic Hep C infection, sepsis, HTN, methamphetamine abuse, and left intracranial hemorrhage that occurred in May 2022 who presents to the Emergency Department for evaluation of ankle pain.  She reports that the pain began yesterday. She describes the pain as being a burning sensation that begins in her big toe and radiates up her right leg and possibly to her back, although she does state that she is unsure about her symptoms since she has decreased sensation on the right side of her body.     Right ankle pain/foot pain  Given patient's clinical prodrome and physical exam, patient most likely has tendinitis or some form of muscle inflammation that could second to a trauma.  Can consider a fracture in the differential given swelling that is present on the top of the foot and some pain with dorsiflexion of the right foot. Less likely a spinal injury given lack of urinary incontinence.  Less likely DVT or arterial insufficiency given good perfusion of the foot as well as presence of distal pulses.   -Right foot x-ray unremarkable  -Right ankle x-ray unremarkable  -Ibuprofen 1000 mg X1 given in  ED    Given pt's negative films, it is reasonable at this time to discharge patient with:  - Tylenol 600 mg prn  - Diclofenac gel  - Advised to stretch, massage, and avoid weight bearing activities if pain persists  - Advised to follow up with neurology for further management.     I have reviewed the nursing notes. I have reviewed the findings, diagnosis, plan and need for follow up with the patient.    Discharge Medication List as of 5/12/2023  2:11 PM      START taking these medications    Details   ibuprofen (ADVIL/MOTRIN) 200 MG tablet Take 3 tablets (600 mg) by mouth every 6 hours as needed for mild pain, Disp-30 tablet, R-0, Local Print             Final diagnoses:   Right foot pain     Sher Cardona MS4  --    ED Attending Physician Attestation    I Toro Powers MD, cared for this patient with the Medical Student. I performed, or re-performed, the physical exam and medical decision-making. I have verified the accuracy of all the medical student findings and documentation above, and have edited as necessary.    Summary of HPI, PE, ED Course   Patient is a 48 year old female evaluated in the emergency department for right foot pain.  Patient has a history of prior hemorrhagic CVA and hemisensory deficit of the right lower extremity.  She experienced some shooting pain in the foot and noticed swelling on the dorsum of her foot but does not recall any specific trauma. Exam and ED course notable for patient with normal vitals, normal mental status.  There is an area of swelling and tenderness without erythema or ecchymosis on the dorsum of the right foot just proximal to the great toe.  No joint effusion.  The pulses are normal in the foot.  Capillary fill is normal in the toes.  Motor function and reflexes normal in the entire lower extremity.  There is no abnormal swelling other than that described previously and there are no skin changes.  Imaging of the right foot and ankle was personally reviewed by myself  and does not show any fracture. After the completion of care in the emergency department, the patient was discharged.    Critical Care & Procedures  Not applicable.        Medical Decision Making  The patient's presentation was of low complexity (an acute and uncomplicated illness or injury).    The patient's evaluation involved:  review of external note(s) from 1 sources (Prior discharge summary from event with hemorrhagic CVA)  ordering and/or review of 2 test(s) in this encounter (Plain films of right foot and right ankle negative)    The patient's management necessitated moderate risk (prescription drug management including medications given in the ED).          Toro Powers MD  Emergency Medicine     Toro Powers MD  [facility] EMERGENCY DEPARTMENT  5/12/2023     Toro Powers MD  05/12/23 1739

## 2023-05-12 NOTE — ED TRIAGE NOTES
"Ankle Pain R ankle pain x 1 day. Pt reports having a stroke last year and has absence of sensation on R side. Pt unsure if she injured ankle or if \"it's nerves waking up\".           Triage Assessment     Row Name 05/12/23 7597       Triage Assessment (Adult)    Airway WDL WDL       Respiratory WDL    Respiratory WDL WDL       Skin Circulation/Temperature WDL    Skin Circulation/Temperature WDL WDL       Cardiac WDL    Cardiac WDL WDL       Peripheral/Neurovascular WDL    Peripheral Neurovascular WDL WDL       Cognitive/Neuro/Behavioral WDL    Cognitive/Neuro/Behavioral WDL WDL              "

## 2023-05-12 NOTE — DISCHARGE INSTRUCTIONS
Thank you for choosing [facility].     Please closely monitor for further symptoms. Return to the Emergency Department if you develop any new or worsening signs or symptoms.    If you received any opiate pain medications or sedatives during your visit, please do not drive for at least 8 hours.     Labs, cultures or final xray interpretations may still need to be reviewed.  We will call you if your plan of care needs to be changed.    Rest, ice, elevate, stretching.  May use ibuprofen or Tylenol if needed for pain.  Please follow up with your primary care physician or clinic, or your neurologist if you have persistent symptoms.